# Patient Record
Sex: MALE | URBAN - METROPOLITAN AREA
[De-identification: names, ages, dates, MRNs, and addresses within clinical notes are randomized per-mention and may not be internally consistent; named-entity substitution may affect disease eponyms.]

---

## 2018-03-12 ENCOUNTER — OFFICE VISIT (OUTPATIENT)
Dept: INTERNAL MEDICINE CLINIC | Age: 38
End: 2018-03-12

## 2018-03-12 VITALS
HEIGHT: 69 IN | BODY MASS INDEX: 27.25 KG/M2 | OXYGEN SATURATION: 98 % | DIASTOLIC BLOOD PRESSURE: 78 MMHG | WEIGHT: 184 LBS | TEMPERATURE: 98 F | HEART RATE: 68 BPM | SYSTOLIC BLOOD PRESSURE: 130 MMHG | RESPIRATION RATE: 18 BRPM

## 2018-03-12 DIAGNOSIS — E66.3 OVERWEIGHT (BMI 25.0-29.9): ICD-10-CM

## 2018-03-12 DIAGNOSIS — J30.2 ACUTE SEASONAL ALLERGIC RHINITIS, UNSPECIFIED TRIGGER: ICD-10-CM

## 2018-03-12 DIAGNOSIS — Z00.00 ROUTINE GENERAL MEDICAL EXAMINATION AT A HEALTH CARE FACILITY: Primary | ICD-10-CM

## 2018-03-12 RX ORDER — ACETAMINOPHEN 325 MG/1
TABLET ORAL
COMMUNITY

## 2018-03-12 NOTE — MR AVS SNAPSHOT
[facility] 
 
 
 [address] [facility] [address] [phone] Patient: Janiya Cassidy MRN: [id number] [facility]:89/65/2806 Visit Information Date & Time Provider Department Dept. Phone Encounter #  
 3/12/2018  8:30 AM Candice Weathers, 40 [facility] [phone] [id number] Follow-up Instructions Return in about 1 year (around 3/12/2019), or if symptoms worsen or fail to improve, for Annual physical exam. Upcoming Health Maintenance Date Due DTaP/Tdap/Td series (2 - Td) 4/13/2021 Allergies as of 3/12/2018  Review Complete On: 3/12/2018 By: Candice Weathers MD  
 No Known Allergies Current Immunizations  Reviewed on 12/20/2013 Name Date Influenza Vaccine Split 11/1/2010 TDAP Vaccine 4/13/2011 Not reviewed this visit You Were Diagnosed With   
  
 Codes Comments Routine general medical examination at a health care facility    -  Primary ICD-10-CM: Z00.00 ICD-9-CM: V70.0 Acute seasonal allergic rhinitis, unspecified trigger     ICD-10-CM: J30.2 ICD-9-CM: 477.8 Overweight (BMI 25.0-29. 9)     ICD-10-CM: J42.3 ICD-9-CM: 278.02 Vitals BP Pulse Temp Resp Height(growth percentile) Weight(growth percentile) 130/78 (BP 1 Location: Right arm, BP Patient Position: Sitting) 68 98 °F (36.7 °C) (Oral) 18 5' 9\" (1.753 m) 184 lb (83.5 kg) SpO2 BMI Smoking Status 98% 27.17 kg/m2 Former Smoker BMI and BSA Data Body Mass Index Body Surface Area  
 27.17 kg/m 2 2.02 m 2 Preferred Pharmacy Pharmacy Name Phone David Lombardi [address] [place] [address] Dr López, 8 [facility]. [phone] Your Updated Medication List  
  
   
This list is accurate as of 3/12/18  8:43 AM.  Always use your most recent med list.  
  
  
  
  
 OTHER Taking [employer] brand 24 hour allergy medication TYLENOL 325 mg tablet Generic drug:  acetaminophen Take  by mouth every four (4) hours as needed for Pain. We Performed the Following CBC WITH AUTOMATED DIFF [25793 CPT(R)] HEMOGLOBIN A1C WITH EAG [20439 CPT(R)] LIPID PANEL [20811 CPT(R)] METABOLIC PANEL, COMPREHENSIVE [37451 CPT(R)] TSH RFX ON ABNORMAL TO FREE T4 [[id number] Custom] Follow-up Instructions Return in about 1 year (around 3/12/2019), or if symptoms worsen or fail to improve, for Annual physical exam.  
  
  
Patient Instructions Well Visit, Ages 25 to 48: Care Instructions Your Care Instructions Physical exams can help you stay healthy. Your doctor has checked your overall health and may have suggested ways to take good care of yourself. He or she also may have recommended tests. At home, you can help prevent illness with healthy eating, regular exercise, and other steps. Follow-up care is a key part of your treatment and safety. Be sure to make and go to all appointments, and call your doctor if you are having problems. It's also a good idea to know your test results and keep a list of the medicines you take. How can you care for yourself at home? · Reach and stay at a healthy weight. This will lower your risk for many problems, such as obesity, diabetes, heart disease, and high blood pressure. · Get at least 30 minutes of physical activity on most days of the week. Walking is a good choice. You also may want to do other activities, such as running, swimming, cycling, or playing tennis or team sports. Discuss any changes in your exercise program with your doctor. · Do not smoke or allow others to smoke around you. If you need help quitting, talk to your doctor about stop-smoking programs and medicines. These can increase your chances of quitting for good. · Talk to your doctor about whether you have any risk factors for sexually transmitted infections (STIs).  Having one sex partner (who does not have STIs and does not have sex with anyone else) is a good way to avoid these infections. · Use birth control if you do not want to have children at this time. Talk with your doctor about the choices available and what might be best for you. · Protect your skin from too much sun. When you're outdoors from 10 a.m. to 4 p.m., stay in the shade or cover up with clothing and a hat with a wide brim. Wear sunglasses that block UV rays. Even when it's cloudy, put broad-spectrum sunscreen (SPF 30 or higher) on any exposed skin. · See a dentist one or two times a year for checkups and to have your teeth cleaned. · Wear a seat belt in the car. · Drink alcohol in moderation, if at all. That means no more than 2 drinks a day for men and 1 drink a day for women. Follow your doctor's advice about when to have certain tests. These tests can spot problems early. For everyone · Cholesterol. Have the fat (cholesterol) in your blood tested after age 21. Your doctor will tell you how often to have this done based on your age, family history, or other things that can increase your risk for heart disease. · Blood pressure. Have your blood pressure checked during a routine doctor visit. Your doctor will tell you how often to check your blood pressure based on your age, your blood pressure results, and other factors. · Vision. Talk with your doctor about how often to have a glaucoma test. 
· Diabetes. Ask your doctor whether you should have tests for diabetes. · Colon cancer. Have a test for colon cancer at age 48. You may have one of several tests. If you are younger than 48, you may need a test earlier if you have any risk factors. Risk factors include whether you already had a precancerous polyp removed from your colon or whether your parent, brother, sister, or child has had colon cancer. For women · Breast exam and mammogram. Talk to your doctor about when you should have a clinical breast exam and a mammogram. Medical experts differ on whether and how often women under 50 should have these tests. Your doctor can help you decide what is right for you. · Pap test and pelvic exam. Begin Pap tests at age 24. A Pap test is the best way to find cervical cancer. The test often is part of a pelvic exam. Ask how often to have this test. 
· Tests for sexually transmitted infections (STIs). Ask whether you should have tests for STIs. You may be at risk if you have sex with more than one person, especially if your partners do not wear condoms. For men · Tests for sexually transmitted infections (STIs). Ask whether you should have tests for STIs. You may be at risk if you have sex with more than one person, especially if you do not wear a condom. · Testicular cancer exam. Ask your doctor whether you should check your testicles regularly. · Prostate exam. Talk to your doctor about whether you should have a blood test (called a PSA test) for prostate cancer. Experts differ on whether and when men should have this test. Some experts suggest it if you are older than 39 and are -American or have a father or brother who got prostate cancer when he was younger than 72. When should you call for help? Watch closely for changes in your health, and be sure to contact your doctor if you have any problems or symptoms that concern you. Where can you learn more? Go to http://jerri-candido.info/. Enter P072 in the search box to learn more about \"Well Visit, Ages 25 to 48: Care Instructions. \" Current as of: May 12, 2017 Content Version: 11.4 © 3124-3015 [employer]. Care instructions adapted under license by [employer] (which disclaims liability or warranty for this information).  If you have questions about a medical condition or this instruction, always ask your healthcare professional. [employer] disclaims any warranty or liability for your use of this information. Introducing [facility]! Dear Tay Adams: 
Thank you for requesting a Biglion account. Our records indicate that you already have an active Biglion account. You can access your account anytime at [email or web address] Did you know that you can access your hospital and ER discharge instructions at any time in Biglion? You can also review all of your test results from your hospital stay or ER visit. Additional Information If you have questions, please visit the Frequently Asked Questions section of the Biglion website at [email or web address] Remember, Biglion is NOT to be used for urgent needs. For medical emergencies, dial 911. Now available from your iPhone and Android! Please provide this summary of care documentation to your next provider. Your primary care clinician is listed as Kai Sanchez. If you have any questions after today's visit, please call [phone].

## 2018-03-12 NOTE — PATIENT INSTRUCTIONS

## 2018-03-12 NOTE — PROGRESS NOTES
CC:  Chief Complaint   Patient presents with   [facility] Annual Wellness Visit       HISTORY OF PRESENT ILLNESS  Ansley Henriquez is a 40 y.o. male. Presents to establish care and for a physical exam.  He has seasonal allergic rhinitis. Previously followed with Dr. Jade Soliman at this clinic. Last seen 12/20/13. He has no complaints today. Soc Hx  . Has 1 daughter age 5. Works as a  at [employer]. Former smoker; quit in 2005. Drinks 4-5 beers a week. Denies recreational drug use. Drinks 2-3 cups of coffee and 1 soda a day. No formal exercise but stays active at work. Health Maintenance  Flu vaccine: declined       Tetanus vaccine: Tdap 4/13/11     Eye exam: over 5 yrs ago.   Dentist: 3/7/18  Lipids: will check today (fasting)  A1c: will check today  Advanced Directives: given information  End of Life: given information      ROS  Constitutional: negative for fevers, chills, night sweats, fatigue, weight loss  Eyes:   negative for visual disturbance and irritation  ENT:   negative for tinnitus, sore throat, nasal congestion, ear pains  Respiratory:  negative for cough, hemoptysis, dyspnea, wheezing  CV:   negative for chest pain, palpitations, lower extremity edema  GI:   negative for heartburn, abd pain, nausea, vomiting, diarrhea, constipation  Endo:               negative for polyuria, polydipsia, polyphagia, cold/heat intolerance  Genitourinary: negative for frequency, dysuria, hematuria  Integument:  negative for rash and pruritus  Hematologic:  negative for easy bruising and gum/nose bleeding  Musculoskel: negative for myalgias, joint pain, back pain, muscle weakness  Neurological:  negative for, dizziness, gait problems; positive for occasional headaches (about 1-2 times a month) resolved with Tylenol  Behavl/Psych: negative for feelings of anxiety, depression, mood change    Patient Active Problem List   Diagnosis Code    IBS (irritable bowel syndrome) K58.9     Past Medical History: Diagnosis Date    Headache(784.0)     IBS (irritable bowel syndrome)      Past Surgical History:   Procedure Laterality Date    HX SEPTOPLASTY  2004     Social History     Social History    Marital status:      Spouse name: N/A    Number of children: N/A    Years of education: N/A     Social History Main Topics    Smoking status: Former Smoker     Quit date: 1/1/2005    Smokeless tobacco: Never Used    Alcohol use 2.0 oz/week     4 Cans of beer per week    Drug use: No    Sexual activity: Not Asked     Other Topics Concern    None     Social History Narrative     Family History   Problem Relation Age of Onset    Diabetes Maternal Grandmother     Heart Disease Maternal Grandfather     Hypertension Maternal Grandfather     No Known Problems Mother     Other Father     No Known Problems Sister     No Known Problems Brother     No Known Problems Sister      No Known Allergies     Current Outpatient Prescriptions   Medication Sig Dispense Refill    OTHER Taking [employer] brand 24 hour allergy medication      acetaminophen (TYLENOL) 325 mg tablet Take  by mouth every four (4) hours as needed for Pain. PHYSICAL EXAM  Visit Vitals    /78 (BP 1 Location: Right arm, BP Patient Position: Sitting)    Pulse 68    Temp 98 °F (36.7 °C) (Oral)    Resp 18    Ht 5' 9\" (1.753 m)    Wt 184 lb (83.5 kg)    SpO2 98%    BMI 27.17 kg/m2       General: Well-developed, well-nourished. In no distress. Appears stated age. Alert and oriented to person, place, and time. HEENT: Normocephalic, atraumatic. PERRLA, EOMI. Nasal mucosa normal.  Oropharynx benign. Neck: Supple, symmetrical, trachea midline, no lymphadenopathy, no carotid bruits, no JVD, thyroid: not enlarged, symmetric, no tenderness/mass/nodules. Lungs: Clear to auscultation bilaterally. No crackles or wheezes. Chest Wall: No tenderness or deformity. Heart: RRR, normal S1 and S2, no murmur, click, rub, or gallop.   Back: Symmetric. ROM normal. No CVA tenderness. Abdomen: Soft, non-distended, bowel sounds normal. No tenderness. No masses. No hepatosplenomegaly. Extremities: No clubbing, cyanosis, or edema. 2+ pedal pulses. Skin: Skin color, texture, turgor normal. No rashes or lesions. Neurological: CN II-XII intact. Normal strength throughout. Musculoskeletal: Gait normal. ROM normal at both knees. Psychiatric: Normal mood and affect. Behavior is normal.          ASSESSMENT AND PLAN    ICD-10-CM ICD-9-CM    1. Routine general medical examination at a health care facility G14.27 V29.8 METABOLIC PANEL, COMPREHENSIVE      LIPID PANEL      CBC WITH AUTOMATED DIFF      HEMOGLOBIN A1C WITH EAG      TSH RFX ON ABNORMAL TO FREE T4   2. Acute seasonal allergic rhinitis, unspecified trigger J30.2 477.8    3. Overweight (BMI 25.0-29. 9) E66.3 278.02        Diagnoses and all orders for this visit:    1. Routine general medical examination at a health care facility  Normal physical exam.  -     METABOLIC PANEL, COMPREHENSIVE  -     LIPID PANEL  -     CBC WITH AUTOMATED DIFF  -     HEMOGLOBIN A1C WITH EAG  -     TSH RFX ON ABNORMAL TO FREE T4    2. Acute seasonal allergic rhinitis, unspecified trigger  Continue OTC allergy medication as needed. 3. Overweight (BMI 25.0-29. 9)  Discussed the patient's BMI with him. The BMI follow up plan is as follows: dietary management education, guidance, and counseling; encourage exercise; monitor weight; prescribed dietary intake. Follow up BMI in 12 months. Follow-up Disposition:  Return in about 1 year (around 3/12/2019), or if symptoms worsen or fail to improve, for Annual physical exam.    Provided patient and/or family with advanced directive information and answered pertinent questions. Encouraged patient to provide a copy of advanced directive to the office when available. I have discussed the diagnosis with the patient and the intended plan as seen in the above orders.  Patient is in agreement. The patient has received an after-visit summary and questions were answered concerning future plans. I have discussed medication side effects and warnings with the patient as well.

## 2018-03-12 NOTE — PROGRESS NOTES
Dylon Polk  Identified pt with two pt identifiers(name and ). No chief complaint on file. 1. Have you been to the ER, urgent care clinic since your last visit? Hospitalized since your last visit? NO    2. Have you seen or consulted any other health care providers outside of the [facility] since your last visit? Include any pap smears or colon screening. NO    Today's provider has been notified of reason for visit, vitals and flowsheets obtained on patients. Patient does not currently have advance directives. Patient given information on advance directives and brochure and printed blank advance directive form made available to patient. Patient is also asked to make copy of directives available to this office for our/[facility] records once advanced directives are completed. Reviewed record In preparation for visit, huddled with provider and have obtained necessary documentation      Health Maintenance Due   Topic    Influenza Age 5 to Adult        Wt Readings from Last 3 Encounters:   18 184 lb (83.5 kg)   13 192 lb (87.1 kg)   11 180 lb 4.8 oz (81.8 kg)     Temp Readings from Last 3 Encounters:   18 98 °F (36.7 °C) (Oral)   13 97.6 °F (36.4 °C) (Tympanic)   11 98.2 °F (36.8 °C)     BP Readings from Last 3 Encounters:   18 130/78   13 138/90   11 121/82     Pulse Readings from Last 3 Encounters:   18 68   13 97   11 84     Vitals:    18 0804   BP: 130/78   Pulse: 68   Resp: 18   Temp: 98 °F (36.7 °C)   TempSrc: Oral   SpO2: 98%   Weight: 184 lb (83.5 kg)   Height: 5' 9\" (1.753 m)   PainSc:   0 - No pain         Learning Assessment:  :     Learning Assessment 3/12/2018   PRIMARY LEARNER Patient   PRIMARY LANGUAGE ENGLISH   LEARNER PREFERENCE PRIMARY READING   ANSWERED BY patient   RELATIONSHIP SELF       Depression Screening:  :     No flowsheet data found.     Fall Risk Assessment:  :     No flowsheet data found.    Abuse Screening:  :     No flowsheet data found. ADL Screening:  :     ADL Assessment 3/12/2018   Feeding yourself No Help Needed   Getting from bed to chair No Help Needed   Getting dressed No Help Needed   Bathing or showering No Help Needed   Walk across the room (includes cane/walker) No Help Needed   Using the telphone No Help Needed   Taking your medications No Help Needed   Preparing meals No Help Needed   Managing money (expenses/bills) No Help Needed   Moderately strenuous housework (laundry) No Help Needed   Shopping for personal items (toiletries/medicines) No Help Needed   Shopping for groceries No Help Needed   Driving No Help Needed   Climbing a flight of stairs No Help Needed   Getting to places beyond walking distances No Help Needed                 Medication reconciliation up to date and corrected with patient at this time.

## 2018-03-13 ENCOUNTER — TELEPHONE (OUTPATIENT)
Dept: INTERNAL MEDICINE CLINIC | Age: 38
End: 2018-03-13

## 2018-03-13 LAB
ALBUMIN SERPL-MCNC: 4.2 G/DL (ref 3.5–5.5)
ALBUMIN/GLOB SERPL: 1.4 {RATIO} (ref 1.2–2.2)
ALP SERPL-CCNC: 59 IU/L (ref 39–117)
ALT SERPL-CCNC: 26 IU/L (ref 0–44)
AST SERPL-CCNC: 23 IU/L (ref 0–40)
BASOPHILS # BLD AUTO: 0 X10E3/UL (ref 0–0.2)
BASOPHILS NFR BLD AUTO: 0 %
BILIRUB SERPL-MCNC: 0.4 MG/DL (ref 0–1.2)
BUN SERPL-MCNC: 13 MG/DL (ref 6–20)
BUN/CREAT SERPL: 15 (ref 9–20)
CALCIUM SERPL-MCNC: 9.7 MG/DL (ref 8.7–10.2)
CHLORIDE SERPL-SCNC: 94 MMOL/L (ref 96–106)
CHOLEST SERPL-MCNC: 199 MG/DL (ref 100–199)
CO2 SERPL-SCNC: 25 MMOL/L (ref 18–29)
CREAT SERPL-MCNC: 0.86 MG/DL (ref 0.76–1.27)
EOSINOPHIL # BLD AUTO: 0.1 X10E3/UL (ref 0–0.4)
EOSINOPHIL NFR BLD AUTO: 2 %
ERYTHROCYTE [DISTWIDTH] IN BLOOD BY AUTOMATED COUNT: 14.5 % (ref 12.3–15.4)
EST. AVERAGE GLUCOSE BLD GHB EST-MCNC: 111 MG/DL
GFR SERPLBLD CREATININE-BSD FMLA CKD-EPI: 111 ML/MIN/1.73
GFR SERPLBLD CREATININE-BSD FMLA CKD-EPI: 128 ML/MIN/1.73
GLOBULIN SER CALC-MCNC: 3.1 G/DL (ref 1.5–4.5)
GLUCOSE SERPL-MCNC: 104 MG/DL (ref 65–99)
HBA1C MFR BLD: 5.5 % (ref 4.8–5.6)
HCT VFR BLD AUTO: 43.4 % (ref 37.5–51)
HDLC SERPL-MCNC: 55 MG/DL
HGB BLD-MCNC: 14.7 G/DL (ref 13–17.7)
IMM GRANULOCYTES # BLD: 0 X10E3/UL (ref 0–0.1)
IMM GRANULOCYTES NFR BLD: 0 %
INTERPRETATION, 910389: NORMAL
LDLC SERPL CALC-MCNC: 124 MG/DL (ref 0–99)
LYMPHOCYTES # BLD AUTO: 1.3 X10E3/UL (ref 0.7–3.1)
LYMPHOCYTES NFR BLD AUTO: 19 %
MCH RBC QN AUTO: 31.8 PG (ref 26.6–33)
MCHC RBC AUTO-ENTMCNC: 33.9 G/DL (ref 31.5–35.7)
MCV RBC AUTO: 94 FL (ref 79–97)
MONOCYTES # BLD AUTO: 0.4 X10E3/UL (ref 0.1–0.9)
MONOCYTES NFR BLD AUTO: 6 %
NEUTROPHILS # BLD AUTO: 4.9 X10E3/UL (ref 1.4–7)
NEUTROPHILS NFR BLD AUTO: 73 %
PLATELET # BLD AUTO: 293 X10E3/UL (ref 150–379)
POTASSIUM SERPL-SCNC: 4.7 MMOL/L (ref 3.5–5.2)
PROT SERPL-MCNC: 7.3 G/DL (ref 6–8.5)
RBC # BLD AUTO: 4.62 X10E6/UL (ref 4.14–5.8)
SODIUM SERPL-SCNC: 135 MMOL/L (ref 134–144)
TRIGL SERPL-MCNC: 99 MG/DL (ref 0–149)
TSH SERPL DL<=0.005 MIU/L-ACNC: 1.06 UIU/ML (ref 0.45–4.5)
VLDLC SERPL CALC-MCNC: 20 MG/DL (ref 5–40)
WBC # BLD AUTO: 6.7 X10E3/UL (ref 3.4–10.8)

## 2018-03-13 NOTE — TELEPHONE ENCOUNTER
S/w patient about lab results and recommendations /  patint stated he had no questions and would work on watching his sugar intake

## 2018-03-13 NOTE — PROGRESS NOTES
Your labs showed that your fasting blood sugar was a little high. All the other blood tests returned normal, including kidney and liver tests, blood counts, thyroid, and cholesterol. Keep up the good work!

## 2021-03-09 ENCOUNTER — OFFICE VISIT (OUTPATIENT)
Dept: INTERNAL MEDICINE CLINIC | Age: 41
End: 2021-03-09
Payer: COMMERCIAL

## 2021-03-09 VITALS
SYSTOLIC BLOOD PRESSURE: 150 MMHG | HEIGHT: 69 IN | DIASTOLIC BLOOD PRESSURE: 82 MMHG | TEMPERATURE: 98.2 F | OXYGEN SATURATION: 96 % | BODY MASS INDEX: 28.73 KG/M2 | HEART RATE: 102 BPM | RESPIRATION RATE: 16 BRPM | WEIGHT: 194 LBS

## 2021-03-09 DIAGNOSIS — Z83.3 FAMILY HISTORY OF DIABETES MELLITUS (DM): ICD-10-CM

## 2021-03-09 DIAGNOSIS — R20.9 BILATERAL COLD FEET: ICD-10-CM

## 2021-03-09 DIAGNOSIS — Z00.00 ROUTINE GENERAL MEDICAL EXAMINATION AT A HEALTH CARE FACILITY: Primary | ICD-10-CM

## 2021-03-09 DIAGNOSIS — E66.3 OVERWEIGHT (BMI 25.0-29.9): ICD-10-CM

## 2021-03-09 DIAGNOSIS — R03.0 ELEVATED BLOOD PRESSURE READING WITHOUT DIAGNOSIS OF HYPERTENSION: ICD-10-CM

## 2021-03-09 PROCEDURE — 99213 OFFICE O/P EST LOW 20 MIN: CPT | Performed by: INTERNAL MEDICINE

## 2021-03-09 PROCEDURE — 99396 PREV VISIT EST AGE 40-64: CPT | Performed by: INTERNAL MEDICINE

## 2021-03-09 NOTE — PROGRESS NOTES
CC:   Chief Complaint   Patient presents with    Complete Physical     Room 3A //     Cold     Feet feel cold sometimes        HISTORY OF PRESENT ILLNESS  Yemi Lugo is a 36 y.o. male. Presents for annual physical exam.  Last seen in clinic on 3/12/18. He has seasonal allergic rhinitis and history of IBS. Complains of both feet feeling cold all the time for a few months. Can occur any time of the day. Denies numbness, tingling, burning, or change in color of toes in the cold. Has a family history of DM. Soc Hx  . Has 1 daughter age 15. Works as a  at [employer]. Former smoker; quit in 2005. Drinks 4-5 beers a week. Denies recreational drug use. Drinks 2 cups of coffee  No formal exercise but stays active at work.     Health Maintenance  Flu vaccine: declined                                                    Tetanus vaccine: Tdap 4/13/11                   ROS  A complete review of systems was performed and is negative except for those mentioned in the HPI. Patient Active Problem List   Diagnosis Code    IBS (irritable bowel syndrome) K58.9     Past Medical History:   Diagnosis Date    Headache(784.0)     IBS (irritable bowel syndrome)      No Known Allergies    Current Outpatient Medications   Medication Sig Dispense Refill    OTHER Taking [employer] brand 24 hour allergy medication      acetaminophen (TYLENOL) 325 mg tablet Take  by mouth every four (4) hours as needed for Pain. PHYSICAL EXAM  Visit Vitals  BP (!) 150/82 (BP 1 Location: Left upper arm, BP Patient Position: Sitting, BP Cuff Size: Adult)   Pulse (!) 102   Temp 98.2 °F (36.8 °C) (Oral)   Resp 16   Ht 5' 9\" (1.753 m)   Wt 194 lb (88 kg)   SpO2 96%   BMI 28.65 kg/m²       General: Overweight, no distress. HEENT:  Head normocephalic/atraumatic, no scleral icterus  Neck: Supple. No lymphadenopathy or thyromegaly. Lungs:  Clear to ausculation bilaterally. Good air movement.   Heart:  Tachycardic, regular rhythm, normal S1 and S2, no murmur, gallop, or rub  Extremities: No clubbing, cyanosis, or edema. 1+ pedal pulses bilaterally. Toes mildly cool to the touch. Normal sensation by monofilament and normal vibratory sensation bilaterally. Neurological: Alert and oriented. Non-focal exam.  Psychiatric: Anxious. Behavior is normal.         ASSESSMENT AND PLAN    ICD-10-CM ICD-9-CM    1. Routine general medical examination at a health care facility  W82.04 J27.9 METABOLIC PANEL, COMPREHENSIVE      CBC WITH AUTOMATED DIFF      LIPID PANEL      HEPATITIS C AB      TSH 3RD GENERATION      T4, FREE      HEMOGLOBIN A1C WITH EAG      METABOLIC PANEL, COMPREHENSIVE   2. Bilateral cold feet  R20.9 782. 9 ANKLE BRACHIAL INDEX   3. Family history of diabetes mellitus (DM)  Z83.3 V18.0    4. Elevated blood pressure reading without diagnosis of hypertension  R03.0 796.2      Diagnoses and all orders for this visit:    1. Routine general medical examination at a health care facility  -     METABOLIC PANEL, COMPREHENSIVE; Future  -     CBC WITH AUTOMATED DIFF  -     LIPID PANEL  -     HEPATITIS C AB  -     TSH 3RD GENERATION  -     T4, FREE  -     HEMOGLOBIN A1C WITH EAG    2. Bilateral cold feet  May be due to PAD versus neuropathy. Will test for both, checking for DM, thyroid disease, and circulation by DAVIAN. Weak pulses on exam, favoring PAD. -     ANKLE BRACHIAL INDEX; Future    3. Family history of diabetes mellitus (DM)    4. Elevated blood pressure reading without diagnosis of hypertension  No history of HTN. Will recheck BP at next clinic visit. 5. Overweight (BMI 25.0-29. 9)      Follow-up and Dispositions    · Return in about 1 month (around 4/9/2021), or if symptoms worsen or fail to improve, for BP check, cold feet; have fasting [facility] done at [facility] within the next 1-2 weeks. I have discussed the diagnosis with the patient and the intended plan as seen in the above orders.  Patient is in agreement. The patient has received an after-visit summary and questions were answered concerning future plans. I have discussed medication side effects and warnings with the patient as well.

## 2021-03-09 NOTE — PROGRESS NOTES
Cassie Cardoza  Identified pt with two pt identifiers(name and ). Chief Complaint   Patient presents with    Complete Physical     Room 3A //     Cold     Feet feel cold sometimes        Reviewed record In preparation for visit and have obtained necessary documentation. 1. Have you been to the ER, urgent care clinic or hospitalized since your last visit? No     2. Have you seen or consulted any other health care providers outside of the [address] [place] [address] since your last visit? Include any pap smears or colon screening. No    Patient does not have an advance directive. Vitals reviewed with provider. Health Maintenance reviewed:     Health Maintenance Due   Topic    Hepatitis C Screening           Wt Readings from Last 3 Encounters:   21 194 lb (88 kg)   18 184 lb (83.5 kg)   13 192 lb (87.1 kg)        Temp Readings from Last 3 Encounters:   21 98.2 °F (36.8 °C) (Oral)   18 98 °F (36.7 °C) (Oral)   13 97.6 °F (36.4 °C) (Tympanic)        BP Readings from Last 3 Encounters:   21 (!) 150/82   18 130/78   13 138/90        Pulse Readings from Last 3 Encounters:   21 (!) 102   18 68   13 97        Vitals:    21 0740 21 0747   BP: (!) 149/97 (!) 150/82   Pulse: (!) 102    Resp: 16    Temp: 98.2 °F (36.8 °C)    TempSrc: Oral    SpO2: 96%    Weight: 194 lb (88 kg)    Height: 5' 9\" (1.753 m)    PainSc:   0 - No pain           Learning Assessment:   :       Learning Assessment 3/12/2018   PRIMARY LEARNER Patient   PRIMARY LANGUAGE ENGLISH   LEARNER PREFERENCE PRIMARY READING   ANSWERED BY patient   RELATIONSHIP SELF        Depression Screening:   :       3 most recent PHQ Screens 3/9/2021   Little interest or pleasure in doing things Not at all   Feeling down, depressed, irritable, or hopeless Not at all   Total Score PHQ 2 0        Fall Risk Assessment:   :     No flowsheet data found.      Abuse Screening:   : No flowsheet data found.      ADL Screening:   :       ADL Assessment 3/12/2018   Feeding yourself No Help Needed   Getting from bed to chair No Help Needed   Getting dressed No Help Needed   Bathing or showering No Help Needed   Walk across the room (includes cane/walker) No Help Needed   Using the telphone No Help Needed   Taking your medications No Help Needed   Preparing meals No Help Needed   Managing money (expenses/bills) No Help Needed   Moderately strenuous housework (laundry) No Help Needed   Shopping for personal items (toiletries/medicines) No Help Needed   Shopping for groceries No Help Needed   Driving No Help Needed   Climbing a flight of stairs No Help Needed   Getting to places beyond walking distances No Help Needed

## 2021-03-09 NOTE — LETTER
3/23/2021 8:20 AM 
 
Mr. Quintin Argueta [address] [place] [address] Results for orders placed or performed in visit on 03/09/21 CBC WITH AUTOMATED DIFF Result Value Ref Range WBC 6.0 3.4 - 10.8 x10E3/uL  
 RBC 4.58 4.14 - 5.80 x10E6/uL HGB 14.9 13.0 - 17.7 g/dL HCT 43.3 37.5 - 51.0 % MCV 95 79 - 97 fL  
 MCH 32.5 26.6 - 33.0 pg  
 MCHC 34.4 31.5 - 35.7 g/dL  
 RDW 12.6 11.6 - 15.4 % PLATELET 752 624 - 964 x10E3/uL NEUTROPHILS 68 Not Estab. % Lymphocytes 21 Not Estab. % MONOCYTES 9 Not Estab. % EOSINOPHILS 2 Not Estab. % BASOPHILS 0 Not Estab. %  
 ABS. NEUTROPHILS 4.1 1.4 - 7.0 x10E3/uL Abs Lymphocytes 1.2 0.7 - 3.1 x10E3/uL  
 ABS. MONOCYTES 0.5 0.1 - 0.9 x10E3/uL  
 ABS. EOSINOPHILS 0.1 0.0 - 0.4 x10E3/uL  
 ABS. BASOPHILS 0.0 0.0 - 0.2 x10E3/uL IMMATURE GRANULOCYTES 0 Not Estab. %  
 ABS. IMM. GRANS. 0.0 0.0 - 0.1 x10E3/uL LIPID PANEL Result Value Ref Range Cholesterol, total 228 (H) 100 - 199 mg/dL Triglyceride 110 0 - 149 mg/dL HDL Cholesterol 55 >39 mg/dL VLDL, calculated 20 5 - 40 mg/dL LDL, calculated 153 (H) 0 - 99 mg/dL HEPATITIS C AB Result Value Ref Range Hep C Virus Ab <0.1 0.0 - 0.9 s/co ratio TSH 3RD GENERATION Result Value Ref Range TSH 1.390 0.450 - 4.500 uIU/mL T4, FREE Result Value Ref Range T4, Free 1.22 0.82 - 1.77 ng/dL HEMOGLOBIN A1C WITH EAG Result Value Ref Range Hemoglobin A1c 5.5 4.8 - 5.6 % Estimated average glucose 111 mg/dL METABOLIC PANEL, COMPREHENSIVE Result Value Ref Range Glucose 117 (H) 65 - 99 mg/dL BUN 14 6 - 24 mg/dL Creatinine 0.94 0.76 - 1.27 mg/dL GFR est non- >59 mL/min/1.73 GFR est  >59 mL/min/1.73  
 BUN/Creatinine ratio 15 9 - 20 Sodium 137 134 - 144 mmol/L Potassium 4.7 3.5 - 5.2 mmol/L Chloride 99 96 - 106 mmol/L  
 CO2 23 20 - 29 mmol/L Calcium 9.5 8.7 - 10.2 mg/dL Protein, total 7.6 6.0 - 8.5 g/dL  Albumin 4.5 4.0 - 5.0 g/dL GLOBULIN, TOTAL 3.1 1.5 - 4.5 g/dL A-G Ratio 1.5 1.2 - 2.2 Bilirubin, total 0.6 0.0 - 1.2 mg/dL Alk. phosphatase 74 39 - 117 IU/L  
 AST (SGOT) 22 0 - 40 IU/L  
 ALT (SGPT) 27 0 - 44 IU/L  
 
RECOMMENDATIONS Message sent to patient by My Chart: 
Your labs showed that your cholesterol was high.  The other labs were all normal, including kidney and liver tests, blood counts, thyroid, diabetes screening test, and hepatitis C screening test.  To lower cholesterol, work on diet, exercise, and weight loss.  For diet, increase fruits, vegetables, and low-fat dairy products (such as low-fat yogurt, 1% or skim milk), and decrease fried foods, fast foods, beef, pork, garcia, sausage, and hot dogs. Sincerely, Mariah Burdick MD

## 2021-03-09 NOTE — PATIENT INSTRUCTIONS
Elevated Blood Pressure: Care Instructions Your Care Instructions Blood pressure is a measure of how hard the blood pushes against the walls of your arteries. It's normal for blood pressure to go up and down throughout the day. But if it stays up over time, you have high blood pressure. Two numbers tell you your blood pressure. The first number is the systolic pressure. It shows how hard the blood pushes when your heart is pumping. The second number is the diastolic pressure. It shows how hard the blood pushes between heartbeats, when your heart is relaxed and filling with blood. An ideal blood pressure in adults is less than 120/80 (say \"120 over 80\"). High blood pressure is 140/90 or higher. You have high blood pressure if your top number is 140 or higher or your bottom number is 90 or higher, or both. The main test for high blood pressure is simple, fast, and painless. To diagnose high blood pressure, your doctor will test your blood pressure at different times. After testing your blood pressure, your doctor may ask you to test it again when you are home. If you are diagnosed with high blood pressure, you can work with your doctor to make a long-term plan to manage it. Follow-up care is a key part of your treatment and safety. Be sure to make and go to all appointments, and call your doctor if you are having problems. It's also a good idea to know your test results and keep a list of the medicines you take. How can you care for yourself at home? · Do not smoke. Smoking increases your risk for heart attack and stroke. If you need help quitting, talk to your doctor about stop-smoking programs and medicines. These can increase your chances of quitting for good. · Stay at a healthy weight. · Try to limit how much sodium you eat to less than 2,300 milligrams (mg) a day. Your doctor may ask you to try to eat less than 1,500 mg a day. · Be physically active. Get at least 30 minutes of exercise on most days of the week. Walking is a good choice. You also may want to do other activities, such as running, swimming, cycling, or playing tennis or team sports. · Avoid or limit alcohol. Talk to your doctor about whether you can drink any alcohol. · Eat plenty of fruits, vegetables, and low-fat dairy products. Eat less saturated and total fats. · Learn how to check your blood pressure at home. When should you call for help? Call your doctor now or seek immediate medical care if: 
? · Your blood pressure is much higher than normal (such as 180/110 or higher). ? · You think high blood pressure is causing symptoms such as: ¨ Severe headache. ¨ Blurry vision. ? Watch closely for changes in your health, and be sure to contact your doctor if: 
? · You do not get better as expected. Where can you learn more? Go to http://www.gray.com/. Enter C080 in the search box to learn more about \"Elevated Blood Pressure: Care Instructions. \" Current as of: September 21, 2016 Content Version: 11.4 © 7838-9925 [employer]. Care instructions adapted under license by [employer] (which disclaims liability or warranty for this information). If you have questions about a medical condition or this instruction, always ask your healthcare professional. [address] any warranty or liability for your use of this information.

## 2021-03-16 LAB
ALBUMIN SERPL-MCNC: 4.5 G/DL (ref 4–5)
ALBUMIN/GLOB SERPL: 1.5 {RATIO} (ref 1.2–2.2)
ALP SERPL-CCNC: 74 IU/L (ref 39–117)
ALT SERPL-CCNC: 27 IU/L (ref 0–44)
AST SERPL-CCNC: 22 IU/L (ref 0–40)
BASOPHILS # BLD AUTO: 0 X10E3/UL (ref 0–0.2)
BASOPHILS NFR BLD AUTO: 0 %
BILIRUB SERPL-MCNC: 0.6 MG/DL (ref 0–1.2)
BUN SERPL-MCNC: 14 MG/DL (ref 6–24)
BUN/CREAT SERPL: 15 (ref 9–20)
CALCIUM SERPL-MCNC: 9.5 MG/DL (ref 8.7–10.2)
CHLORIDE SERPL-SCNC: 99 MMOL/L (ref 96–106)
CHOLEST SERPL-MCNC: 228 MG/DL (ref 100–199)
CO2 SERPL-SCNC: 23 MMOL/L (ref 20–29)
CREAT SERPL-MCNC: 0.94 MG/DL (ref 0.76–1.27)
EOSINOPHIL # BLD AUTO: 0.1 X10E3/UL (ref 0–0.4)
EOSINOPHIL NFR BLD AUTO: 2 %
ERYTHROCYTE [DISTWIDTH] IN BLOOD BY AUTOMATED COUNT: 12.6 % (ref 11.6–15.4)
EST. AVERAGE GLUCOSE BLD GHB EST-MCNC: 111 MG/DL
GLOBULIN SER CALC-MCNC: 3.1 G/DL (ref 1.5–4.5)
GLUCOSE SERPL-MCNC: 117 MG/DL (ref 65–99)
HBA1C MFR BLD: 5.5 % (ref 4.8–5.6)
HCT VFR BLD AUTO: 43.3 % (ref 37.5–51)
HCV AB S/CO SERPL IA: <0.1 S/CO RATIO (ref 0–0.9)
HDLC SERPL-MCNC: 55 MG/DL
HGB BLD-MCNC: 14.9 G/DL (ref 13–17.7)
IMM GRANULOCYTES # BLD AUTO: 0 X10E3/UL (ref 0–0.1)
IMM GRANULOCYTES NFR BLD AUTO: 0 %
LDLC SERPL CALC-MCNC: 153 MG/DL (ref 0–99)
LYMPHOCYTES # BLD AUTO: 1.2 X10E3/UL (ref 0.7–3.1)
LYMPHOCYTES NFR BLD AUTO: 21 %
MCH RBC QN AUTO: 32.5 PG (ref 26.6–33)
MCHC RBC AUTO-ENTMCNC: 34.4 G/DL (ref 31.5–35.7)
MCV RBC AUTO: 95 FL (ref 79–97)
MONOCYTES # BLD AUTO: 0.5 X10E3/UL (ref 0.1–0.9)
MONOCYTES NFR BLD AUTO: 9 %
NEUTROPHILS # BLD AUTO: 4.1 X10E3/UL (ref 1.4–7)
NEUTROPHILS NFR BLD AUTO: 68 %
PLATELET # BLD AUTO: 290 X10E3/UL (ref 150–450)
POTASSIUM SERPL-SCNC: 4.7 MMOL/L (ref 3.5–5.2)
PROT SERPL-MCNC: 7.6 G/DL (ref 6–8.5)
RBC # BLD AUTO: 4.58 X10E6/UL (ref 4.14–5.8)
SODIUM SERPL-SCNC: 137 MMOL/L (ref 134–144)
T4 FREE SERPL-MCNC: 1.22 NG/DL (ref 0.82–1.77)
TRIGL SERPL-MCNC: 110 MG/DL (ref 0–149)
TSH SERPL DL<=0.005 MIU/L-ACNC: 1.39 UIU/ML (ref 0.45–4.5)
VLDLC SERPL CALC-MCNC: 20 MG/DL (ref 5–40)
WBC # BLD AUTO: 6 X10E3/UL (ref 3.4–10.8)

## 2021-03-18 NOTE — PROGRESS NOTES
Message sent to patient by My Chart:  Your labs showed that your cholesterol was high. The other labs were all normal, including kidney and liver tests, blood counts, thyroid, diabetes screening test, and hepatitis C screening test.  To lower cholesterol, work on diet, exercise, and weight loss. For diet, increase fruits, vegetables, and low-fat dairy products (such as low-fat yogurt, 1% or skim milk), and decrease fried foods, fast foods, beef, pork, garcia, sausage, and hot dogs.     Dr. Barby Jaramillo

## 2023-05-10 RX ORDER — ACETAMINOPHEN 325 MG/1
TABLET ORAL EVERY 4 HOURS PRN
COMMUNITY